# Patient Record
Sex: MALE | Race: WHITE
[De-identification: names, ages, dates, MRNs, and addresses within clinical notes are randomized per-mention and may not be internally consistent; named-entity substitution may affect disease eponyms.]

---

## 2019-10-07 ENCOUNTER — HOSPITAL ENCOUNTER (EMERGENCY)
Dept: HOSPITAL 97 - ER | Age: 29
Discharge: HOME | End: 2019-10-07
Payer: COMMERCIAL

## 2019-10-07 VITALS — DIASTOLIC BLOOD PRESSURE: 106 MMHG | TEMPERATURE: 97.6 F | SYSTOLIC BLOOD PRESSURE: 150 MMHG | OXYGEN SATURATION: 98 %

## 2019-10-07 DIAGNOSIS — F17.210: ICD-10-CM

## 2019-10-07 DIAGNOSIS — F10.188: ICD-10-CM

## 2019-10-07 DIAGNOSIS — G90.09: Primary | ICD-10-CM

## 2019-10-07 LAB
BUN BLD-MCNC: 11 MG/DL (ref 7–18)
GLUCOSE SERPLBLD-MCNC: 108 MG/DL (ref 74–106)
HCT VFR BLD CALC: 48.5 % (ref 39.6–49)
INR BLD: 1.02
LYMPHOCYTES # SPEC AUTO: 1.3 K/UL (ref 0.7–4.9)
PMV BLD: 7 FL (ref 7.6–11.3)
POTASSIUM SERPL-SCNC: 3.9 MMOL/L (ref 3.5–5.1)
RBC # BLD: 5.21 M/UL (ref 4.33–5.43)

## 2019-10-07 PROCEDURE — 85610 PROTHROMBIN TIME: CPT

## 2019-10-07 PROCEDURE — 80320 DRUG SCREEN QUANTALCOHOLS: CPT

## 2019-10-07 PROCEDURE — 80048 BASIC METABOLIC PNL TOTAL CA: CPT

## 2019-10-07 PROCEDURE — 36415 COLL VENOUS BLD VENIPUNCTURE: CPT

## 2019-10-07 PROCEDURE — 99283 EMERGENCY DEPT VISIT LOW MDM: CPT

## 2019-10-07 PROCEDURE — 85025 COMPLETE CBC W/AUTO DIFF WBC: CPT

## 2019-10-07 NOTE — EDPHYS
Physician Documentation                                                                           

 [facility]                                                                 

Name: Lenard Chakraborty                                                                               

Age: 29 yrs                                                                                       

Sex: Male                                                                                         

: 1990                                                                                   

MRN: [id number]                                                                                   

Arrival Date: 10/07/2019                                                                          

Time: 10:41                                                                                       

Account#: [id number]                                                                            

Bed 9                                                                                             

Private MD:                                                                                       

ED Physician Kb Camacho                                                                       

HPI:                                                                                              

10/07                                                                                             

16:36 This 29 yrs old  Male presents to ER via Ambulatory with complaints of         kdr 

      Numbness.                                                                                   

16:36 The patient's problem is reported as paresthesias, in right lower extremity, in left    kdr 

      upper extremity, weakness, that is generalized. Onset: The symptoms/episode                 

      began/occurred gradually, This has been intermittent and vague for the past several         

      months. He states that his feet up to just below his knees will feel cold and numb but      

      that the feeling is transient. Duration: The episodes are intermittent. Context: has        

      been occurring intermittently for some months. The symptoms are alleviated by nothing.      

      The symptoms are aggravated by nothing. Associated signs and symptoms: Pertinent            

      positives: weakness. Severity of symptoms: At their worst the symptoms were mild            

      moderate just prior to arrival, in the emergency department the symptoms are unchanged.     

      Patient's baseline: Neuro: alert and fully oriented, Motor: no deficits, Ambulation:        

      walks without assistance, Speech: normal, The patient has a previous history of The         

      patient states that he drinks about a 1/2 liter of rum every day.. The patient has          

      experienced similar episodes in the past, a few times. The patient has not recently         

      seen a physician.                                                                           

                                                                                                  

Historical:                                                                                       

- Allergies:                                                                                      

11:02 No Known Allergies;                                                                     iw  

- Home Meds:                                                                                      

11:02 None [Active];                                                                          iw  

- PMHx:                                                                                           

11:02 None;                                                                                   iw  

- PSHx:                                                                                           

11:02 None;                                                                                   iw  

                                                                                                  

- Immunization history:: Adult Immunizations not up to date.                                      

- Social history:: Smoking status: Patient uses tobacco products, smokes one pack                 

  cigarettes per day.                                                                             

- Ebola Screening: : Patient negative for fever greater than or equal to 101.5 degrees            

  Fahrenheit, and additional compatible Ebola Virus Disease symptoms Patient denies               

  exposure to infectious person Patient denies travel to an Ebola-affected area in the            

  21 days before illness onset No symptoms or risks identified at this time.                      

                                                                                                  

                                                                                                  

ROS:                                                                                              

16:36 Constitutional: Negative for fever, chills, and weight loss, Eyes: Negative for injury, kdr 

      pain, redness, and discharge, Neck: Negative for injury, pain, and swelling,                

      Cardiovascular: Negative for chest pain, palpitations, and edema, Respiratory: Negative     

      for shortness of breath, cough, wheezing, and pleuritic chest pain, Abdomen/GI:             

      Negative for abdominal pain, nausea, vomiting, diarrhea, and constipation, Back:            

      Negative for injury and pain, MS/Extremity: Negative for injury and deformity, Skin:        

      Negative for injury, rash, and discoloration, Psych: Negative for depression, anxiety,      

      suicide ideation, homicidal ideation, and hallucinations, Allergy/Immunology: Negative      

      for hives, rash, and allergies, Endocrine: Negative for neck swelling, polydipsia,          

      polyuria, polyphagia, and marked weight changes, Hematologic/Lymphatic: Negative for        

      swollen nodes, abnormal bleeding, and unusual bruising.                                     

16:36 Neuro: Positive for weakness, Negative for altered mental status, dizziness, gait           

      disturbance, hearing loss, loss of consciousness, seizure activity, speech changes,         

      syncope, near syncope, tinnitus, tremor, visual changes.                                    

                                                                                                  

Exam:                                                                                             

16:36 Constitutional:  This is a well developed, well nourished patient who is awake, alert,  kdr 

      and in no acute distress. Head/Face:  Normocephalic, atraumatic. Eyes:  Pupils equal        

      round and reactive to light, extra-ocular motions intact.  Lids and lashes normal.          

      Conjunctiva and sclera are non-icteric and not injected.  Cornea within normal limits.      

      Periorbital areas with no swelling, redness, or edema. Neck:  Trachea midline, no           

      thyromegaly or masses palpated, and no cervical lymphadenopathy.  Supple, full range of     

      motion without nuchal rigidity, or vertebral point tenderness.  No Meningismus.             

      Chest/axilla:  Normal chest wall appearance and motion.  Nontender with no deformity.       

      No lesions are appreciated. Cardiovascular:  Regular rate and rhythm with a normal S1       

      and S2.  No gallops, murmurs, or rubs.  Normal PMI, no JVD.  No pulse deficits.             

      Respiratory:  Lungs have equal breath sounds bilaterally, clear to auscultation and         

      percussion.  No rales, rhonchi or wheezes noted.  No increased work of breathing, no        

      retractions or nasal flaring. Abdomen/GI:  Soft, non-tender, with normal bowel sounds.      

      No distension or tympany.  No guarding or rebound.  No evidence of tenderness               

      throughout. Back:  No spinal tenderness.  No costovertebral tenderness.  Full range of      

      motion. Skin:  Warm, dry with normal turgor.  Normal color with no rashes, no lesions,      

      and no evidence of cellulitis. MS/ Extremity:  Pulses equal, no cyanosis.                   

      Neurovascular intact.  Full, normal range of motion. Neuro:  Awake and alert, GCS 15,       

      oriented to person, place, time, and situation.  Cranial nerves II-XII grossly intact.      

      Motor strength 5/5 in all extremities.  Sensory grossly intact.  Cerebellar exam            

      normal.  Normal gait. Psych:  Awake, alert, with orientation to person, place and time.     

       Behavior, mood, and affect are within normal limits.                                       

16:49 CT study not indicated or reported.  Reason for not performing CT: Not done             kdr 

                                                                                                  

Vital Signs:                                                                                      

11:02  / 106; Pulse 77; Resp 16; Temp 97.6(TE); Pulse Ox 98% on R/A; Weight 113.4 kg;   iw  

      Height 5 ft. 10 in. (177.80 cm); Pain 0/10;                                                 

11:02 Body Mass Index 35.87 (113.40 kg, 177.80 cm)                                            iw  

                                                                                                  

MDM:                                                                                              

13:24 Patient medically screened.                                                             kdr 

16:36 Data reviewed: vital signs, nurses notes, lab test result(s). Counseling: I had a       kdr 

      detailed discussion with the patient and/or guardian regarding: the historical points,      

      exam findings, and any diagnostic results supporting the discharge/admit diagnosis, lab     

      results, the need for outpatient follow up.                                                 

                                                                                                  

10/07                                                                                             

12:08 Order name: CBC with Diff; Complete Time: 13:19                                         kdr 

10/07                                                                                             

12:08 Order name: Chem 7; Complete Time: 13:19                                                kdr 

10/07                                                                                             

12:08 Order name: PT-INR; Complete Time: 13:19                                                kdr 

10/07                                                                                             

12:08 Order name: ETOH Level; Complete Time: 13:19                                            kdr 

10/07                                                                                             

12:08 Order name: Urine Dipstick-Ancillary (obtain specimen); Complete Time: 13:29            kdr 

                                                                                                  

Administered Medications:                                                                         

No medications were administered                                                                  

                                                                                                  

                                                                                                  

Disposition:                                                                                      

10/07/19 13:24 Discharged to Home. Impression: Other idiopathic peripheral autonomic              

  neuropathy, Alcohol abuse, Alcohol abuse with intoxication, Alcohol abuse                       

  with other alcohol-induced disorder.                                                            

- Condition is Stable.                                                                            

- Discharge Instructions: Chemical Dependency, Peripheral Neuropathy, Alcohol                     

  Intoxication, Easy-to-Read, Alcohol Abuse and Nutrition, Alcohol Withdrawal,                    

  Easy-to-Read, What You Need to Know About Alcohol Abuse and Dependence, Youth.                  

                                                                                                  

- Medication Reconciliation Form, Thank You Letter, Work release form form.                       

- Follow up: Private Physician; When: 1 - 2 days; Reason: If symptoms return, Further             

  diagnostic work-up, Recheck today's complaints, Continuance of care, Re-evaluation by           

  your physician.                                                                                 

- Problem is an ongoing problem.                                                                  

- Symptoms are unchanged.                                                                         

                                                                                                  

                                                                                                  

                                                                                                  

Signatures:                                                                                       

Dispatcher [employer]                           EDMS                                                 

Kb Camacho MD MD   kdr                                                  

Yanet Esteban RN                     RN   iw                                                   

                                                                                                  

Corrections: (The following items were deleted from the chart)                                    

13:47 13:24 10/07/2019 13:24 Discharged to Home. Impression: Other idiopathic peripheral      iw  

      autonomic neuropathy; Alcohol abuse; Alcohol abuse with intoxication; Alcohol abuse         

      with other alcohol-induced disorder. Condition is Stable. Forms are Medication              

      Reconciliation Form, Thank You Letter, Antibiotic Education, Prescription Opioid Use.       

      Follow up: Private Physician; When: 1 - 2 days; Reason: If symptoms return, Further         

      diagnostic work-up, Recheck today's complaints, Continuance of care, Re-evaluation by       

      your physician. Problem is an ongoing problem. Symptoms are unchanged. kdr                  

                                                                                                  

**************************************************************************************************

## 2019-10-07 NOTE — ER
Nurse's Notes                                                                                     

 [facility]                                                                 

Name: Lenard Chakraborty                                                                               

Age: 29 yrs                                                                                       

Sex: Male                                                                                         

: 1990                                                                                   

MRN: [id number]                                                                                   

Arrival Date: 10/07/2019                                                                          

Time: 10:41                                                                                       

Account#: [id number]                                                                            

Bed 9                                                                                             

Private MD:                                                                                       

Diagnosis: Other idiopathic peripheral autonomic neuropathy;Alcohol abuse;Alcohol abuse with      

  intoxication;Alcohol abuse with other alcohol-induced disorder                                  

                                                                                                  

Presentation:                                                                                     

10/07                                                                                             

10:59 Presenting complaint: Patient states: shayne feet numb up to shin, happens intermittently  iw  

      since a couple months ago, this episode started this morning, feet feel asleep and          

      cold, episodes usually last about 12 hours, denies hx of diabetes. Transition of care:      

      patient was not received from another setting of care. Onset of symptoms was 2019. Risk Assessment: Do you want to hurt yourself or someone else? Patient reports no     

      desire to harm self or others. Initial Sepsis Screen: Does the patient meet any 2           

      criteria? No. Patient's initial sepsis screen is negative. Does the patient have a          

      suspected source of infection? No. Patient's initial sepsis screen is negative. Care        

      prior to arrival: None.                                                                     

10:59 Method Of Arrival: Ambulatory                                                           iw  

10:59 Acuity: MAGDI 3                                                                           iw  

                                                                                                  

Triage Assessment:                                                                                

13:45 General: Appears in no apparent distress. Behavior is calm, cooperative.                iw  

                                                                                                  

Historical:                                                                                       

- Allergies:                                                                                      

11:02 No Known Allergies;                                                                     iw  

- Home Meds:                                                                                      

11:02 None [Active];                                                                          iw  

- PMHx:                                                                                           

11:02 None;                                                                                   iw  

- PSHx:                                                                                           

11:02 None;                                                                                   iw  

                                                                                                  

- Immunization history:: Adult Immunizations not up to date.                                      

- Social history:: Smoking status: Patient uses tobacco products, smokes one pack                 

  cigarettes per day.                                                                             

- Ebola Screening: : Patient negative for fever greater than or equal to 101.5 degrees            

  Fahrenheit, and additional compatible Ebola Virus Disease symptoms Patient denies               

  exposure to infectious person Patient denies travel to an Ebola-affected area in the            

  21 days before illness onset No symptoms or risks identified at this time.                      

                                                                                                  

                                                                                                  

Screenin:45 Abuse screen: Denies threats or abuse. Denies injuries from another. Nutritional        iw  

      screening: No deficits noted. Tuberculosis screening: No symptoms or risk factors           

      identified. Fall Risk IV access (20 points).                                                

                                                                                                  

Assessment:                                                                                       

11:00 General: Appears in no apparent distress. Behavior is calm, cooperative. Neuro: Level   iw  

      of Consciousness is awake, alert, obeys commands, Oriented to person, place, time,          

      situation, Moves all extremities. Full function. Neuro: Reports paresthesias in right       

      leg and left leg. Cardiovascular: Patient's skin is warm and dry. Respiratory:              

      Respiratory effort is even, unlabored, Respiratory pattern is regular. GI: No signs         

      and/or symptoms were reported involving the gastrointestinal system. Derm: Skin is          

      intact, is healthy with good turgor. Musculoskeletal: Range of motion: intact in all        

      extremities.                                                                                

12:45 Reassessment: Patient appears in no apparent distress at this time. Patient and/or      iw  

      family updated on plan of care and expected duration. Pain level reassessed. Patient is     

      alert, oriented x 3, equal unlabored respirations, skin warm/dry/pink.                      

13:40 Reassessment: Patient appears in no apparent distress at this time. Patient and/or      iw  

      family updated on plan of care and expected duration. Pain level reassessed. Patient is     

      alert, oriented x 3, equal unlabored respirations, skin warm/dry/pink. Pain: Denies         

      pain.                                                                                       

                                                                                                  

Vital Signs:                                                                                      

11:02  / 106; Pulse 77; Resp 16; Temp 97.6(TE); Pulse Ox 98% on R/A; Weight 113.4 kg;   iw  

      Height 5 ft. 10 in. (177.80 cm); Pain 0/10;                                                 

11:02 Body Mass Index 35.87 (113.40 kg, 177.80 cm)                                            iw  

                                                                                                  

ED Course:                                                                                        

10:41 Patient arrived in ED.                                                                  mr  

11:00 Patient has correct armband on for positive identification.                             iw  

11:01 Triage completed.                                                                       iw  

11:02 Arm band placed on.                                                                     iw  

11:54 Yanet Esteban, RN is Primary Nurse.                                                   iw  

11:57 Kb Camacho MD is Attending Physician.                                              kdr 

12:44 Initial lab(s) drawn, by me, sent to lab. Inserted saline lock: 20 gauge in left        iw  

      antecubital area, using aseptic technique. Blood collected.                                 

13:46 No provider procedures requiring assistance completed. IV discontinued, intact,         iw  

      bleeding controlled, No redness/swelling at site. Pressure dressing applied.                

                                                                                                  

Administered Medications:                                                                         

No medications were administered                                                                  

                                                                                                  

                                                                                                  

Outcome:                                                                                          

13:24 Discharge ordered by MD.                                                                kdr 

13:46 Discharged to home ambulatory, with family.                                             iw  

13:46 Condition: good                                                                             

13:46 Discharge instructions given to patient, family, Instructed on discharge instructions,      

      follow up and referral plans. medication usage, Demonstrated understanding of               

      instructions, follow-up care.                                                               

13:47 Patient left the ED.                                                                    iw  

                                                                                                  

Signatures:                                                                                       

Kb Camacho MD MD   [facility]                                                  

April Kang Irene, RN                     RN   iw                                                   

                                                                                                  

**************************************************************************************************

## 2024-11-04 ENCOUNTER — HOSPITAL ENCOUNTER (EMERGENCY)
Dept: HOSPITAL 97 - ER | Age: 34
Discharge: ANOTHER HOSPITAL | End: 2024-11-04
Payer: SELF-PAY

## 2024-11-04 DIAGNOSIS — I82.0: ICD-10-CM

## 2024-11-04 DIAGNOSIS — K72.90: Primary | ICD-10-CM

## 2024-11-04 DIAGNOSIS — Z72.0: ICD-10-CM

## 2024-11-04 LAB
ALBUMIN SERPL BCP-MCNC: 2.8 G/DL (ref 3.4–5)
ALBUMIN/GLOB SERPL: 0.7 {RATIO} (ref 1.1–1.8)
ALP SERPL-CCNC: 260 U/L (ref 45–117)
ALT SERPL W P-5'-P-CCNC: 59 U/L (ref 16–61)
ANION GAP SERPL CALC-SCNC: 12.7 MEQ/L (ref 5–15)
ANION GAP SERPL CALC-SCNC: 13.5 MEQ/L (ref 5–15)
APTT BLD: 34.2 SECONDS (ref 24.3–36.9)
AST SERPL W P-5'-P-CCNC: 285 U/L (ref 15–37)
BUN BLD-MCNC: 4 MG/DL (ref 7–18)
BUN BLD-MCNC: 4 MG/DL (ref 7–18)
GLOBULIN SER CALC-MCNC: 4 G/DL (ref 2.3–3.5)
GLUCOSE SERPLBLD-MCNC: 117 MG/DL (ref 74–106)
GLUCOSE SERPLBLD-MCNC: 93 MG/DL (ref 74–106)
HCT VFR BLD CALC: 44.5 % (ref 39.6–49)
HGB BLD-MCNC: 15.3 G/DL (ref 13.6–17.9)
INR BLD: 1.63
LIPASE SERPL-CCNC: 59 U/L (ref 13–75)
LYMPHOCYTES # SPEC AUTO: 2.3 K/UL (ref 0.7–4.9)
MCH RBC QN AUTO: 35.5 PG (ref 27–35)
MCHC RBC AUTO-ENTMCNC: 34.4 G/DL (ref 32–36)
MCV RBC: 103.2 FL (ref 80–100)
NRBC # BLD: 0 10*3/UL (ref 0–0)
NRBC BLD AUTO-RTO: 0 % (ref 0–0)
PMV BLD: 8.3 FL (ref 7.6–11.3)
POTASSIUM SERPL-SCNC: 3.5 MEQ/L (ref 3.5–5.1)
POTASSIUM SERPL-SCNC: 5.7 MEQ/L (ref 3.5–5.1)
PROTHROMBIN TIME: 18 SECONDS (ref 9.4–12.5)
RBC # BLD: 4.31 M/UL (ref 4.33–5.43)
SQUAMOUS URNS QL MICRO: <5 /HPF
UA COMPLETE W REFLEX CULTURE PNL UR: (no result)
UA DIPSTICK W REFLEX MICRO PNL UR: (no result)
WBC # BLD AUTO: 9.8 THOU/UL (ref 4.3–10.9)

## 2024-11-04 PROCEDURE — 96374 THER/PROPH/DIAG INJ IV PUSH: CPT

## 2024-11-04 PROCEDURE — 85025 COMPLETE CBC W/AUTO DIFF WBC: CPT

## 2024-11-04 PROCEDURE — 80074 ACUTE HEPATITIS PANEL: CPT

## 2024-11-04 PROCEDURE — 82140 ASSAY OF AMMONIA: CPT

## 2024-11-04 PROCEDURE — 83735 ASSAY OF MAGNESIUM: CPT

## 2024-11-04 PROCEDURE — 81001 URINALYSIS AUTO W/SCOPE: CPT

## 2024-11-04 PROCEDURE — 85730 THROMBOPLASTIN TIME PARTIAL: CPT

## 2024-11-04 PROCEDURE — 76705 ECHO EXAM OF ABDOMEN: CPT

## 2024-11-04 PROCEDURE — 83690 ASSAY OF LIPASE: CPT

## 2024-11-04 PROCEDURE — 85610 PROTHROMBIN TIME: CPT

## 2024-11-04 PROCEDURE — 74177 CT ABD & PELVIS W/CONTRAST: CPT

## 2024-11-04 PROCEDURE — 80048 BASIC METABOLIC PNL TOTAL CA: CPT

## 2024-11-04 PROCEDURE — 80053 COMPREHEN METABOLIC PANEL: CPT

## 2024-11-04 PROCEDURE — 99285 EMERGENCY DEPT VISIT HI MDM: CPT

## 2024-11-04 PROCEDURE — 36415 COLL VENOUS BLD VENIPUNCTURE: CPT

## 2024-11-05 VITALS — TEMPERATURE: 98 F

## 2024-11-05 VITALS — DIASTOLIC BLOOD PRESSURE: 89 MMHG | OXYGEN SATURATION: 97 % | SYSTOLIC BLOOD PRESSURE: 141 MMHG
